# Patient Record
Sex: FEMALE | Race: BLACK OR AFRICAN AMERICAN | NOT HISPANIC OR LATINO | URBAN - METROPOLITAN AREA
[De-identification: names, ages, dates, MRNs, and addresses within clinical notes are randomized per-mention and may not be internally consistent; named-entity substitution may affect disease eponyms.]

---

## 2024-01-24 ENCOUNTER — CLINICAL SUPPORT (OUTPATIENT)
Dept: AUDIOLOGY | Facility: CLINIC | Age: 69
End: 2024-01-24
Payer: COMMERCIAL

## 2024-01-24 DIAGNOSIS — H90.3 SENSORINEURAL HEARING LOSS (SNHL) OF BOTH EARS: Primary | ICD-10-CM

## 2024-01-24 PROCEDURE — 92550 TYMPANOMETRY & REFLEX THRESH: CPT | Performed by: AUDIOLOGIST

## 2024-01-24 PROCEDURE — 92557 COMPREHENSIVE HEARING TEST: CPT | Performed by: AUDIOLOGIST

## 2024-01-24 NOTE — PROGRESS NOTES
"  ADULT AUDIOMETRIC EVALUATION    Name:  Kay Jeffery  :  1955  Age:  68 y.o.  Date of Evaluation:  2024    HISTORY:  Reason for visit: Ms. Jeffery is seen today for an evaluation of hearing. Patient was unaccompanied.    Patient reports known hearing loss with the use of hearing aids. She reports her mother had a cochlear implant. She feels as though her hearing loss is progressing.    Hearing Aid History:  Bilateral Oticon RITEs dispensed outside of [facility]    EVALUATION:    See Audiogram and Immittance results under \"Media\".    RESULTS:     Otoscopic Evaluation:     RIGHT  Clear canal with tympanic membrane visualized    LEFT  Clear canal with tympanic membrane visualized    Immittance:   Immittance Measures: 226 Hz          Right Ear: Type A: Normal middle ear function         Left Ear:  Type A: Normal middle ear function    Reflexes and Reflex Decay:    Ipsilateral Reflexes (500-4000 Hz):          Probe/Stimulus Right Ear: present 500 Hz, absent at 9033-2336 Hz       Probe/Stimulus Left Ear: present 500-1000 Hz, absent at 5555-6202 Hz         Audiometry:  Test Technique: Standard Audiometry under insert phones.    Reliability: Good     Pure Tone Audiometry:    Right: Moderate to moderately-severe sensorineural hearing loss 125-8000 Hz   Left: Moderate to moderately-severe sensorineural hearing loss 125-8000 Hz       Speech Audiometry (via recorded, 25-words unless noted; M=masked):       Right Ear: Speech Reception Threshold (SRT) was obtained at 65 dBHL  Word Recognition Scores were Fair (76%) in quiet when words were presented at 95 dBHL, using the CNC 2 word list.  Left Ear: Speech Reception Threshold (SRT) was obtained at 55 dBHL  Word Recognition Scores were Poor (68%) in quiet when words were presented at 85 dBHL, using the CNC 3 word list.    IMPRESSIONS:  Today's test results suggest normal middle ear function.   Right: Moderate to moderately-severe sensorineural hearing " loss 125-8000 Hz with fair (76%) word understanding.   Left: Moderate to moderately-severe sensorineural hearing loss 125-8000 Hz with poor (68%) word understanding.    PATIENT EDUCATION:   Kay Jeffery was counseled with regard to the findings. She will need to go to [facility] for hearing aids via her [employer] insurance. We discussed cochlear implant candidacy evaluation briefly. She will try new technology and consider CI evaluation if she does not do well with hearing aids.    PLAN:  Follow up with PCP as directed.  Retest hearing annually; sooner if concerns or changes arise.  Continued hearing aid use.  Hearing aid evaluation ([facility]).      Kasia Dougherty, SANTO, CCC-A  Clinical Audiologist    Time: 3504-1425    Degree of   Hearing Sensitivity dB Range   Within Normal Limits (WNL) 0 - 20   Slight 25   Mild 26 - 40   Moderate 41 - 55   Moderately-Severe 56 - 70   Severe 71 - 90   Profound 91 +     KEY  TM Tympanic Membrane   WNL Within Normal Limits   HA Hearing Aid   SNHL Sensorineural Hearing Loss   CHL Conductive Hearing Loss   NIHL Noise-Induced Hearing Loss   ECV Ear Canal Volume

## 2024-04-24 ENCOUNTER — OFFICE VISIT (OUTPATIENT)
Dept: OPHTHALMOLOGY | Facility: CLINIC | Age: 69
End: 2024-04-24
Payer: COMMERCIAL

## 2024-04-24 DIAGNOSIS — H52.223 REGULAR ASTIGMATISM OF BOTH EYES: ICD-10-CM

## 2024-04-24 DIAGNOSIS — H52.03 HYPEROPIA OF BOTH EYES: ICD-10-CM

## 2024-04-24 DIAGNOSIS — H40.003 GLAUCOMA SUSPECT OF BOTH EYES: Primary | ICD-10-CM

## 2024-04-24 DIAGNOSIS — H52.4 PRESBYOPIA: ICD-10-CM

## 2024-04-24 DIAGNOSIS — E10.9 TYPE 1 DIABETES MELLITUS WITHOUT COMPLICATION (MULTI): ICD-10-CM

## 2024-04-24 DIAGNOSIS — H25.813 COMBINED FORMS OF AGE-RELATED CATARACT OF BOTH EYES: ICD-10-CM

## 2024-04-24 LAB
AVERAGE RNFL TODAY (OD): 94
AVERAGE RNFL TODAY (OS): 93
C/D RATIO TODAY (OD): 0.74
C/D RATIO TODAY (OS): 0.79

## 2024-04-24 PROCEDURE — 92133 CPTRZD OPH DX IMG PST SGM ON: CPT | Performed by: OPHTHALMOLOGY

## 2024-04-24 PROCEDURE — 76514 ECHO EXAM OF EYE THICKNESS: CPT | Performed by: OPHTHALMOLOGY

## 2024-04-24 PROCEDURE — 92015 DETERMINE REFRACTIVE STATE: CPT | Performed by: OPHTHALMOLOGY

## 2024-04-24 PROCEDURE — 92083 EXTENDED VISUAL FIELD XM: CPT | Performed by: OPHTHALMOLOGY

## 2024-04-24 PROCEDURE — 92004 COMPRE OPH EXAM NEW PT 1/>: CPT | Performed by: OPHTHALMOLOGY

## 2024-04-24 ASSESSMENT — VISUAL ACUITY
OS_CC: 20/30-2
OS_BAT_MED: 20/30
OD_BAT_MED: 20/30
OD_CC: 20/40-2
METHOD: SNELLEN - LINEAR

## 2024-04-24 ASSESSMENT — SLIT LAMP EXAM - LIDS
COMMENTS: NORMAL
COMMENTS: NORMAL

## 2024-04-24 ASSESSMENT — EXTERNAL EXAM - RIGHT EYE: OD_EXAM: NORMAL

## 2024-04-24 ASSESSMENT — TONOMETRY
OS_IOP_MMHG: 22
OD_IOP_MMHG: 19
IOP_METHOD: GOLDMANN APPLANATION

## 2024-04-24 ASSESSMENT — REFRACTION_MANIFEST
OS_SPHERE: +3.50
OS_ADD: +2.75
OD_AXIS: 105
OD_CYLINDER: -1.25
OS_CYLINDER: -1.00
OD_SPHERE: +2.75
OS_AXIS: 100
OD_ADD: +2.75

## 2024-04-24 ASSESSMENT — REFRACTION_WEARINGRX
OS_AXIS: 071
OD_AXIS: 177
OD_CYLINDER: -1.50
OS_CYLINDER: -0.75
OD_SPHERE: +3.50
OS_SPHERE: +3.00
OD_ADD: +2.75
OS_ADD: +2.75

## 2024-04-24 ASSESSMENT — PACHYMETRY
OS_CT(UM): 567
OD_CT(UM): 565

## 2024-04-24 ASSESSMENT — ENCOUNTER SYMPTOMS: EYES NEGATIVE: 1

## 2024-04-24 ASSESSMENT — CUP TO DISC RATIO
OS_RATIO: 0.75
OD_RATIO: 0.7

## 2024-04-24 ASSESSMENT — EXTERNAL EXAM - LEFT EYE: OS_EXAM: NORMAL

## 2024-04-24 NOTE — PROGRESS NOTES
Assessment/Plan   Diagnoses and all orders for this visit:  Glaucoma suspect of both eyes  -     OCT, Optic Nerve - OU - Both Eyes  -     Kohli Visual Field - OU - Both Eyes  Pachy: 565/567  Yearly monitoring was stressed to patient  Visual field (VF) wnl bot h eyes, no rnfl thinning on OCT    Type 1 diabetes mellitus without complication (Multi)  -no evidence of diabetic retinopathy at the present time  -pt was advised of the importance of good diabetes control and the importance of a yearly dilated diabetic exam    Combined forms of age-related cataract of both eyes  continue to monitor    Hyperopia of both eyes  Regular astigmatism of both eyes  Presbyopia  Refractive error  -give Rx for new glasses    Return for a dilated exam in    12 months or sooner if having any problems  VF , OCT at next visit

## 2025-04-30 ENCOUNTER — APPOINTMENT (OUTPATIENT)
Dept: OPHTHALMOLOGY | Facility: CLINIC | Age: 70
End: 2025-04-30
Payer: COMMERCIAL